# Patient Record
Sex: MALE | Race: WHITE | NOT HISPANIC OR LATINO
[De-identification: names, ages, dates, MRNs, and addresses within clinical notes are randomized per-mention and may not be internally consistent; named-entity substitution may affect disease eponyms.]

---

## 2020-11-12 ENCOUNTER — RESULT REVIEW (OUTPATIENT)
Age: 65
End: 2020-11-12

## 2021-01-31 PROBLEM — Z00.00 ENCOUNTER FOR PREVENTIVE HEALTH EXAMINATION: Status: ACTIVE | Noted: 2021-01-31

## 2021-02-01 ENCOUNTER — APPOINTMENT (OUTPATIENT)
Dept: DISASTER EMERGENCY | Facility: CLINIC | Age: 66
End: 2021-02-01

## 2022-11-30 ENCOUNTER — EMERGENCY (EMERGENCY)
Facility: HOSPITAL | Age: 67
LOS: 1 days | Discharge: HOME | End: 2022-11-30
Attending: EMERGENCY MEDICINE
Payer: COMMERCIAL

## 2022-11-30 VITALS
RESPIRATION RATE: 18 BRPM | WEIGHT: 175.05 LBS | OXYGEN SATURATION: 96 % | SYSTOLIC BLOOD PRESSURE: 144 MMHG | HEART RATE: 68 BPM | TEMPERATURE: 98 F | DIASTOLIC BLOOD PRESSURE: 84 MMHG

## 2022-11-30 PROCEDURE — 73110 X-RAY EXAM OF WRIST: CPT | Mod: 26,RT

## 2022-11-30 PROCEDURE — 73130 X-RAY EXAM OF HAND: CPT | Mod: 26,RT

## 2022-11-30 PROCEDURE — 99284 EMERGENCY DEPT VISIT MOD MDM: CPT

## 2022-11-30 PROCEDURE — 99283 EMERGENCY DEPT VISIT LOW MDM: CPT

## 2022-11-30 PROCEDURE — 73110 X-RAY EXAM OF WRIST: CPT

## 2022-11-30 PROCEDURE — 73130 X-RAY EXAM OF HAND: CPT

## 2022-11-30 RX ORDER — CEPHALEXIN 500 MG
1 CAPSULE ORAL
Qty: 28 | Refills: 0
Start: 2022-11-30 | End: 2022-12-06

## 2022-11-30 RX ORDER — IBUPROFEN 200 MG
600 TABLET ORAL ONCE
Refills: 0 | Status: COMPLETED | OUTPATIENT
Start: 2022-11-30 | End: 2022-11-30

## 2022-11-30 RX ORDER — CEPHALEXIN 500 MG
500 CAPSULE ORAL ONCE
Refills: 0 | Status: COMPLETED | OUTPATIENT
Start: 2022-11-30 | End: 2022-11-30

## 2022-11-30 RX ADMIN — Medication 500 MILLIGRAM(S): at 05:40

## 2022-11-30 RX ADMIN — Medication 600 MILLIGRAM(S): at 04:40

## 2022-11-30 NOTE — ED ADULT TRIAGE NOTE - CHIEF COMPLAINT QUOTE
C/O right hand pain and swelling for the past 2 days. Pt was removed a window unit AC the other day and twisted his wrist. Pt then went to work and was lifting heavy objects. +sensation and palpable pulse.  Tried to take Tylenol  and had no relief.

## 2022-11-30 NOTE — ED PROVIDER NOTE - PATIENT PORTAL LINK FT
You can access the FollowMyHealth Patient Portal offered by [facility] by registering at the following website: [email or web address]. By joining [employer]’s FollowMyHealth portal, you will also be able to view your health information using other applications (apps) compatible with our system.

## 2022-11-30 NOTE — ED ADULT NURSE NOTE - OBJECTIVE STATEMENT
assumed care of pt from triage, pt AAOX4, resp. even and unlabored on RA, pt c/o rt hand pain x2 days after taking an air conditioner out of the window, pt denies any crushing injury, no obvious abrasions/lacerations, rt hand edema, neg. trauma

## 2022-11-30 NOTE — ED PROVIDER NOTE - PHYSICAL EXAMINATION
Const: Awake, alert and oriented. In no acute distress. Well appearing.  HEENT: NC/AT. Moist mucous membranes.  Eyes: No scleral icterus. EOMI.  Neck:. Soft and supple. Full ROM without pain.  Cardiac: +S1/S2. No murmurs. Peripheral pulses 2+ and symmetric. No LE edema.  Resp: Speaking in full sentences. No evidence of respiratory distress. No wheezes, rales or rhonchi.  Abd: Soft, non-tender, non-distended. Normal bowel sounds in all 4 quadrants. No guarding or rebound.  Back: Spine midline and non-tender. No CVAT.  MSK: No bony tenderness noted on palpation FROM in all extremities neurovasculary intact radial pulse 2+ hand  5/5   Skin: diffuse R hand swelling, erythema to thenar eminence with abrasion and erythema extending past palmar aspect of wrist   Lymph: No cervical lymphadenopathy.  Neuro: Awake, alert & oriented x 3. Moves all extremities symmetrically.

## 2022-11-30 NOTE — ED PROVIDER NOTE - NS ED ATTENDING STATEMENT MOD
This was a shared visit with the ZULMA. I reviewed and verified the documentation and independently performed the documented:

## 2022-11-30 NOTE — ED ADULT NURSE NOTE - NSIMPLEMENTINTERV_GEN_ALL_ED
Implemented All Universal Safety Interventions:  [place] to call system. Call bell, personal items and telephone within reach. Instruct patient to call for assistance. Room bathroom lighting operational. Non-slip footwear when patient is off stretcher. Physically safe environment: no spills, clutter or unnecessary equipment. Stretcher in lowest position, wheels locked, appropriate side rails in place.

## 2022-11-30 NOTE — ED PROVIDER NOTE - CARE PROVIDER_API CALL
Delroy Garcia)  Orthopaedic Surgery; Surgery of the Hand  [address] [place] [address]  Phone: [phone]  Fax: [phone]  Follow Up Time:

## 2022-11-30 NOTE — ED PROVIDER NOTE - ATTENDING APP SHARED VISIT CONTRIBUTION OF CARE
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 Exam notable for diffuse R hand swelling, erythema to thenar eminence with abrasion and erythema extending past palmar aspect of wrist, no ttp. Xray negative for fx. No infectious symptoms. Likely swelling 2/2 MSK injury but will prescribe abx given the erythema extending up arm. Strict return precautions given.

## 2022-11-30 NOTE — ED PROVIDER NOTE - NSFOLLOWUPINSTRUCTIONS_ED_ALL_ED_FT
will give orthopedic follow up  patient to take antibiotics as prescribed  ACE wrap  return to the ed for any worsening symptoms

## 2022-11-30 NOTE — ED PROVIDER NOTE - OBJECTIVE STATEMENT
pt is a 67 y/o male presenting to the ed for evaluation of right hand pain/swelling. pt states yesterday was removing a window unit AC and the hand pressed up against the unit during this. pt after went to work and was lifting heavy objects but did not drop an object directly on his hand. pt states tonight hand/wrist became swollen  pt denies cp sob fever nausea vomiting numbness or loss of sensation abrasions or lacerations

## 2022-12-04 ENCOUNTER — APPOINTMENT (OUTPATIENT)
Dept: ORTHOPEDIC SURGERY | Facility: CLINIC | Age: 67
End: 2022-12-04

## 2022-12-04 ENCOUNTER — RESULT REVIEW (OUTPATIENT)
Age: 67
End: 2022-12-04

## 2022-12-04 VITALS — BODY MASS INDEX: 25.92 KG/M2 | HEIGHT: 69 IN | WEIGHT: 175 LBS

## 2022-12-04 DIAGNOSIS — S62.90XA UNSPECIFIED FRACTURE OF UNSPECIFIED WRIST AND HAND, INITIAL ENCOUNTER FOR CLOSED FRACTURE: ICD-10-CM

## 2022-12-04 PROCEDURE — 73130 X-RAY EXAM OF HAND: CPT | Mod: RT

## 2022-12-04 PROCEDURE — 99204 OFFICE O/P NEW MOD 45 MIN: CPT

## 2022-12-04 PROCEDURE — 73110 X-RAY EXAM OF WRIST: CPT | Mod: RT

## 2022-12-04 RX ORDER — AMOXICILLIN AND CLAVULANATE POTASSIUM 875; 125 MG/1; MG/1
875-125 TABLET, COATED ORAL
Qty: 14 | Refills: 1 | Status: ACTIVE | COMMUNITY
Start: 2022-12-04 | End: 1900-01-01

## 2022-12-04 RX ORDER — METHYLPREDNISOLONE 4 MG/1
4 TABLET ORAL
Qty: 1 | Refills: 0 | Status: ACTIVE | COMMUNITY
Start: 2022-12-04 | End: 1900-01-01

## 2022-12-04 NOTE — HISTORY OF PRESENT ILLNESS
[10] : 10 [8] : 8 [Radiating] : radiating [Sharp] : sharp [Constant] : constant [Meds] : meds [de-identified] : 12/4/22: pt is a 66 y/o M presents with right wrist pain; onset of pain on november 26th 022; pt states that he was picking up and AC and felt pain in the wrist; pt went to [facility] and was told he had cellulitis and was given antibiotics but has not been taking them; pt then went to [place] and was told he needed to see a hand specialist; pt has been taking percocet for the pain but has ran out of pills. No fevers or chills. Pain has been keeping him up at night. Has been wearing a cock up wrist splint. [] : no [FreeTextEntry7] : from the upper wrist and around the base of the hand [FreeTextEntry9] : percocet 5mg

## 2022-12-04 NOTE — IMAGING
[de-identified] : R Hand/Wrist\par \par No lymphadenopathy in RUE\par +Erythematous and edema\par +edema all five digits with erythema \par +anterior scaphoid tenderness\par SILT \par fingers wwp, no warmth [Right] : right wrist [FreeTextEntry8] : No acute fracture or dislocation noted

## 2022-12-04 NOTE — ASSESSMENT
[FreeTextEntry1] : 68 y/o M with crush injury to R hand/wrist. Fracture versus cellulitis \par \par -STAT MRI R wrist\par -Volar  wrist Splint applied\par -MDP \par -Augmentin 875 BID  for possible cellulitis \par -Will follow up with Dr. Olmstead in one day

## 2022-12-05 ENCOUNTER — APPOINTMENT (OUTPATIENT)
Dept: ORTHOPEDIC SURGERY | Facility: CLINIC | Age: 67
End: 2022-12-05

## 2022-12-05 VITALS — HEIGHT: 69 IN | BODY MASS INDEX: 25.92 KG/M2 | WEIGHT: 175 LBS

## 2022-12-05 DIAGNOSIS — Z87.891 PERSONAL HISTORY OF NICOTINE DEPENDENCE: ICD-10-CM

## 2022-12-05 DIAGNOSIS — M65.9 SYNOVITIS AND TENOSYNOVITIS, UNSPECIFIED: ICD-10-CM

## 2022-12-05 DIAGNOSIS — M65.831 OTHER SYNOVITIS AND TENOSYNOVITIS, RIGHT FOREARM: ICD-10-CM

## 2022-12-05 PROCEDURE — 99214 OFFICE O/P EST MOD 30 MIN: CPT

## 2022-12-05 NOTE — ASSESSMENT
[FreeTextEntry1] : The patient was advised of the diagnosis. The natural history of the pathology was explained in full to the patient in layman's terms. All questions were answered. The risks and benefits of surgical and non-surgical treatment alternatives were explained in full to the patient.\par \par Pt will finish medrol dose pack.\par Will discuss possibility of CSI if pain is not gone\par F/u in 9 days

## 2022-12-05 NOTE — HISTORY OF PRESENT ILLNESS
[de-identified] : 12/5/22: pt is a 66 y/o M presents with right wrist pain; onset of pain on november 26th 022; pt states that he was picking up and AC and felt pain in the wrist; pt went to [facility] and was told he had cellulitis and was given antibiotics but has not been taking them; pt then went to [place] and was told he needed to see a hand specialist; pt has been taking percocet for the pain but has ran out of pills. No fevers or chills. Pain has been keeping him up at night.  Pt was seen in [facility] and presents in splint.  Pt was given steroids and took them last night and states he feels 50% better.\par \par MRI right wrist:\par Severe tenosynovitis\par Flexor and extensor tenosynovitis [] : Post Surgical Visit: no [de-identified] : [place] [facility]  [de-identified] : xrays/mri

## 2022-12-14 ENCOUNTER — APPOINTMENT (OUTPATIENT)
Dept: ORTHOPEDIC SURGERY | Facility: CLINIC | Age: 67
End: 2022-12-14